# Patient Record
Sex: FEMALE
[De-identification: names, ages, dates, MRNs, and addresses within clinical notes are randomized per-mention and may not be internally consistent; named-entity substitution may affect disease eponyms.]

---

## 2024-04-12 ENCOUNTER — TRANSCRIPTION ENCOUNTER (OUTPATIENT)
Age: 54
End: 2024-04-12

## 2024-04-12 ENCOUNTER — APPOINTMENT (OUTPATIENT)
Dept: DERMATOLOGY | Facility: CLINIC | Age: 54
End: 2024-04-12
Payer: COMMERCIAL

## 2024-04-12 VITALS — DIASTOLIC BLOOD PRESSURE: 92 MMHG | HEART RATE: 85 BPM | SYSTOLIC BLOOD PRESSURE: 116 MMHG

## 2024-04-12 DIAGNOSIS — L65.8 OTHER SPECIFIED NONSCARRING HAIR LOSS: ICD-10-CM

## 2024-04-12 DIAGNOSIS — L65.9 NONSCARRING HAIR LOSS, UNSPECIFIED: ICD-10-CM

## 2024-04-12 DIAGNOSIS — L21.9 SEBORRHEIC DERMATITIS, UNSPECIFIED: ICD-10-CM

## 2024-04-12 PROBLEM — Z00.00 ENCOUNTER FOR PREVENTIVE HEALTH EXAMINATION: Status: ACTIVE | Noted: 2024-04-12

## 2024-04-12 PROCEDURE — 99204 OFFICE O/P NEW MOD 45 MIN: CPT

## 2024-04-13 PROBLEM — L65.8 FEMALE PATTERN HAIR LOSS: Status: ACTIVE | Noted: 2024-04-13

## 2024-04-13 PROBLEM — L21.9 SEBORRHEIC DERMATITIS OF SCALP: Status: ACTIVE | Noted: 2024-04-13

## 2024-04-13 NOTE — HISTORY OF PRESENT ILLNESS
[de-identified] : NP here for hair loss Feels like it started in summer 2023 after she was started on MTX for RA (currently on 15mg weekly + FA) Also with hypothyroidism though following with endo and under good control Scalp asx Has not tried topical minoxidil as has cats and concerned about toxicity Does feel like shedding has subsided a bit more recently [FreeTextEntry1] : NP hair loss

## 2024-04-13 NOTE — PHYSICAL EXAM
[FreeTextEntry3] : Diffusely fine hair over scalp, neg hair pull Fine white scale diffusely over scalp No scalp erythema /92

## 2024-04-13 NOTE — ASSESSMENT
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 Instructed to leave on prior to rinsing -Will start PO minoxidil 1.25 mg daily (instructed to use pill cutter to cut 2.5 mg tablet in half) - Discussed off-label use of medication. Reviewed expectation of ~6 mos of treatment to determine efficacy and need for continued use for sustained effect. SED including but not limited to hypertrichosis, temporary shedding of hair, hypotension, headache, lightheadedness, tachycardia, fluid retention, rare cardiovascular side effects.  RTC 3 months

## 2024-04-15 LAB — 25(OH)D3 SERPL-MCNC: 35 NG/ML

## 2024-04-24 ENCOUNTER — NON-APPOINTMENT (OUTPATIENT)
Age: 54
End: 2024-04-24

## 2024-04-26 ENCOUNTER — TRANSCRIPTION ENCOUNTER (OUTPATIENT)
Age: 54
End: 2024-04-26